# Patient Record
Sex: MALE | Race: WHITE | Employment: FULL TIME | URBAN - METROPOLITAN AREA
[De-identification: names, ages, dates, MRNs, and addresses within clinical notes are randomized per-mention and may not be internally consistent; named-entity substitution may affect disease eponyms.]

---

## 2019-08-11 ENCOUNTER — APPOINTMENT (OUTPATIENT)
Dept: GENERAL RADIOLOGY | Age: 54
End: 2019-08-11
Payer: COMMERCIAL

## 2019-08-11 ENCOUNTER — HOSPITAL ENCOUNTER (EMERGENCY)
Age: 54
Discharge: HOME | End: 2019-08-11
Attending: EMERGENCY MEDICINE
Payer: COMMERCIAL

## 2019-08-11 VITALS
DIASTOLIC BLOOD PRESSURE: 53 MMHG | TEMPERATURE: 98.1 F | WEIGHT: 294.19 LBS | HEART RATE: 56 BPM | SYSTOLIC BLOOD PRESSURE: 122 MMHG | BODY MASS INDEX: 46.08 KG/M2 | RESPIRATION RATE: 17 BRPM | OXYGEN SATURATION: 95 %

## 2019-08-11 DIAGNOSIS — R07.9 RIGHT-SIDED CHEST PAIN: Primary | ICD-10-CM

## 2019-08-11 DIAGNOSIS — R09.1 PLEURISY: ICD-10-CM

## 2019-08-11 LAB
A/G RATIO: 1.5 (ref 1.1–2.2)
ALBUMIN SERPL-MCNC: 4.2 G/DL (ref 3.4–5)
ALP BLD-CCNC: 117 U/L (ref 40–129)
ALT SERPL-CCNC: 22 U/L (ref 10–40)
ANION GAP SERPL CALCULATED.3IONS-SCNC: 11 MMOL/L (ref 3–16)
AST SERPL-CCNC: 19 U/L (ref 15–37)
BASOPHILS ABSOLUTE: 0.1 K/UL (ref 0–0.2)
BASOPHILS RELATIVE PERCENT: 0.7 %
BILIRUB SERPL-MCNC: 0.3 MG/DL (ref 0–1)
BUN BLDV-MCNC: 21 MG/DL (ref 7–20)
CALCIUM SERPL-MCNC: 8.9 MG/DL (ref 8.3–10.6)
CHLORIDE BLD-SCNC: 107 MMOL/L (ref 99–110)
CO2: 22 MMOL/L (ref 21–32)
CREAT SERPL-MCNC: 0.8 MG/DL (ref 0.9–1.3)
D DIMER: <200 NG/ML DDU (ref 0–229)
EOSINOPHILS ABSOLUTE: 0.2 K/UL (ref 0–0.6)
EOSINOPHILS RELATIVE PERCENT: 2.6 %
GFR AFRICAN AMERICAN: >60
GFR NON-AFRICAN AMERICAN: >60
GLOBULIN: 2.8 G/DL
GLUCOSE BLD-MCNC: 97 MG/DL (ref 70–99)
HCT VFR BLD CALC: 45.7 % (ref 40.5–52.5)
HEMOGLOBIN: 15.2 G/DL (ref 13.5–17.5)
LYMPHOCYTES ABSOLUTE: 2.1 K/UL (ref 1–5.1)
LYMPHOCYTES RELATIVE PERCENT: 24.1 %
MCH RBC QN AUTO: 29.1 PG (ref 26–34)
MCHC RBC AUTO-ENTMCNC: 33.4 G/DL (ref 31–36)
MCV RBC AUTO: 87.2 FL (ref 80–100)
MONOCYTES ABSOLUTE: 0.6 K/UL (ref 0–1.3)
MONOCYTES RELATIVE PERCENT: 6.6 %
NEUTROPHILS ABSOLUTE: 5.7 K/UL (ref 1.7–7.7)
NEUTROPHILS RELATIVE PERCENT: 66 %
PDW BLD-RTO: 13.8 % (ref 12.4–15.4)
PLATELET # BLD: 221 K/UL (ref 135–450)
PMV BLD AUTO: 8.1 FL (ref 5–10.5)
POTASSIUM REFLEX MAGNESIUM: 4.1 MMOL/L (ref 3.5–5.1)
RBC # BLD: 5.23 M/UL (ref 4.2–5.9)
SODIUM BLD-SCNC: 140 MMOL/L (ref 136–145)
TOTAL PROTEIN: 7 G/DL (ref 6.4–8.2)
TROPONIN: <0.01 NG/ML
WBC # BLD: 8.7 K/UL (ref 4–11)

## 2019-08-11 PROCEDURE — 99285 EMERGENCY DEPT VISIT HI MDM: CPT

## 2019-08-11 PROCEDURE — 80053 COMPREHEN METABOLIC PANEL: CPT

## 2019-08-11 PROCEDURE — 84484 ASSAY OF TROPONIN QUANT: CPT

## 2019-08-11 PROCEDURE — 85379 FIBRIN DEGRADATION QUANT: CPT

## 2019-08-11 PROCEDURE — 36415 COLL VENOUS BLD VENIPUNCTURE: CPT

## 2019-08-11 PROCEDURE — 93005 ELECTROCARDIOGRAM TRACING: CPT | Performed by: EMERGENCY MEDICINE

## 2019-08-11 PROCEDURE — 85025 COMPLETE CBC W/AUTO DIFF WBC: CPT

## 2019-08-11 PROCEDURE — 71046 X-RAY EXAM CHEST 2 VIEWS: CPT

## 2019-08-11 RX ORDER — IBUPROFEN 200 MG
200 TABLET ORAL EVERY 6 HOURS PRN
COMMUNITY

## 2019-08-11 RX ORDER — CHOLECALCIFEROL (VITAMIN D3) 1250 MCG
1 CAPSULE ORAL WEEKLY
COMMUNITY

## 2019-08-11 RX ORDER — GLUCOSAMINE SULFATE 500 MG
CAPSULE ORAL
COMMUNITY

## 2019-08-11 ASSESSMENT — ENCOUNTER SYMPTOMS
NAUSEA: 0
BACK PAIN: 0
DIARRHEA: 0
CONSTIPATION: 0
COUGH: 0
SHORTNESS OF BREATH: 0
ABDOMINAL PAIN: 0
VOMITING: 0

## 2019-08-11 NOTE — ED PROVIDER NOTES
This patient was seen by the Mid-Level Provider. I have seen and examined the patient, agree with the workup, evaluation, management and diagnosis. Care plan has been discussed. My assessment reveals a 49-year-old male who presents with some right-sided pleuritic chest pain. Patient presents with some shortness of breath and some right-sided pleuritic chest pain is had for the last several weeks. He denies substernal chest pain. He denies any cardiac history. The patient's work-up was unremarkable and normal including a chest x-ray and a d-dimer and a troponin. The patient is low risk based on his heart score and will be discharged with prompt follow-up with his primary care physician and instructed to return if worse. Exam: Well-nourished male in no acute distress. His chest was clear to auscultation bilaterally. Heart was regular rate rhythm with no murmurs rubs gallops. MDM: 49-year-old male who presents with pleuritic right-sided chest pain his scores low on her heart score. He will be discharged with prompt follow-up with his primary care and for a stress test.  He is to return if worse. EKG: Sinus rhythm at a rate of 68 beats a minute with no acute ST elevations or depressions or pathologic Q waves.     Results for orders placed or performed during the hospital encounter of 08/11/19   EKG 12 Lead   Result Value Ref Range    Ventricular Rate 68 BPM    Atrial Rate 68 BPM    P-R Interval 138 ms    QRS Duration 98 ms    Q-T Interval 372 ms    QTc Calculation (Bazett) 395 ms    P Axis -3 degrees    R Axis -10 degrees    T Axis 12 degrees    Diagnosis Normal sinus rhythmNormal ECG      Xr Chest Standard (2 Vw)    Result Date: 8/11/2019  EXAMINATION: TWO XRAY VIEWS OF THE CHEST 8/11/2019 4:18 pm COMPARISON: May 20, 2013 HISTORY: ORDERING SYSTEM PROVIDED HISTORY: chest pain TECHNOLOGIST PROVIDED HISTORY: Reason for exam:->chest pain Reason for Exam: CHEST PRESSURE FOR THE LAST WEEK WITH INCREASED SHORTNESS OF BREATH. NON SMOKER. HISTORY OF A ABDOMINAL HERNIA. Acuity: Acute Type of Exam: Initial FINDINGS: Lungs are clear. Cardiomegaly. No pulmonary edema. Moderate thoracic spine degenerative changes. No acute findings.          Leocadia Bosworth, MD  08/11/19 2746

## 2019-08-11 NOTE — ED PROVIDER NOTES
[address] [facility]  eMERGENCY dEPARTMENT eNCOUnter        Pt Name: Phil Lundy  MRN: [id number]  [place] 1965  Date of evaluation: 8/11/2019  Provider: FRANCISCO Jenkins  PCP: Mikayla Le  ED Attending: Edilberto Carrizales MD    CHIEF COMPLAINT       Chief Complaint   Patient presents with    Chest Pain     pt reports shortness of breath x2 weeks, upper right sided chest pain when he takes a deep breath. progressingly worsening. denies cardiac history. HISTORY OF PRESENT ILLNESS   (Location/Symptom, Timing/Onset, Context/Setting, Quality, Duration, Modifying Factors, Severity)  Note limiting factors. Phil Lundy is a 52 y.o. male who presents to the emergency department with complaints of right sided pleuritic chest pain and pain with movement of the right upper extremity going on for the past 2 weeks. Patient states that this morning when he got up and got out of bed he moved his right arm and had significant sharp pain in the right side chest.  Denies any radiation of this pain. Denies any focal numbness tingling or weakness. Denies any left-sided neck pain or chest pain. Denies any pressure-like sensation, diaphoresis or significant shortness of breath. No fevers, chills or hemoptysis. Denies any recent travel recent surgeries or prolonged immobilization. Denies any unilateral leg pain, swelling or rash. Denies any history of smoking. No history of hypertension, diabetes, hyperlipidemia and family history is pretty insignificant for cardiac disease. No history of CAD for himself. Nursing Notes were all reviewed and agreed with or any disagreements were addressed  in the HPI. REVIEW OF SYSTEMS    (2-9 systems for level 4, 10 or more for level 5)     Review of Systems   Constitutional: Negative for chills, fatigue and fever. Respiratory: Negative for cough and shortness of breath. Cardiovascular: Positive for chest pain.  Negative for palpitations and leg swelling. Gastrointestinal: Negative for abdominal pain, constipation, diarrhea, nausea and vomiting. Musculoskeletal: Negative for back pain and neck pain. Neurological: Negative for weakness, light-headedness and numbness. All other systems reviewed and are negative. Positives and pertinent negatives as per HPI. All other systems were reviewed and are negative. PHYSICAL EXAM    (up to 7 for level 4, 8 or more for level 5)     ED Triage Vitals [08/11/19 1556]   BP Temp Temp src Pulse Resp SpO2 Height Weight   (!) 163/88 98.1 °F (36.7 °C) -- 67 16 96 % -- 294 lb 3 oz (133.4 kg)       Physical Exam   Constitutional: He is oriented to person, place, and time. He appears well-developed and well-nourished. He is active and cooperative. Non-toxic appearance. HENT:   Head: Normocephalic. Right Ear: External ear normal.   Left Ear: External ear normal.   Nose: Nose normal.   Eyes: Conjunctivae are normal. Right eye exhibits no discharge. Left eye exhibits no discharge. Neck: Normal range of motion. Neck supple. Cardiovascular: Normal rate, regular rhythm and normal heart sounds. Exam reveals no gallop and no friction rub. No murmur heard. Pulses:       Radial pulses are 2+ on the right side, and 2+ on the left side. Pulmonary/Chest: Effort normal and breath sounds normal. No stridor. No respiratory distress. He has no wheezes. He has no rales. He exhibits no tenderness. Abdominal: Soft. Bowel sounds are normal. He exhibits no distension and no mass. There is no tenderness. There is no guarding. Musculoskeletal: Normal range of motion. Neurological: He is alert and oriented to person, place, and time. Skin: Skin is warm and dry. He is not diaphoretic. No pallor. Psychiatric: He has a normal mood and affect. His behavior is normal.   Nursing note and vitals reviewed.       PAST MEDICAL HISTORY     Past Medical History:   Diagnosis Date    Abdominal hernia SURGICAL HISTORY     History reviewed. No pertinent surgical history. CURRENT MEDICATIONS       Discharge Medication List as of 8/11/2019  6:02 PM      CONTINUE these medications which have NOT CHANGED    Details   Glucosamine 500 MG CAPS Take by mouthHistorical Med      ibuprofen (ADVIL;MOTRIN) 200 MG tablet Take 200 mg by mouth every 6 hours as needed for PainHistorical Med      Cholecalciferol (VITAMIN D3) 47573 units CAPS Take 1 capsule by mouth once a weekHistorical Med      nabumetone (RELAFEN) 750 MG tablet Take 750 mg by mouth 2 times daily. predniSONE (DELTASONE) 10 MG tablet Take 2 tablets daily x 3 days and then 1 tablet daily X 3 days. Start Monday, Disp-10 tablet, R-0             ALLERGIES     Penicillins    FAMILY HISTORY     History reviewed. No pertinent family history.      SOCIAL HISTORY       Social History     Socioeconomic History    Marital status:      Spouse name: None    Number of children: None    Years of education: None    Highest education level: None   Occupational History    None   Social Needs    Financial resource strain: None    Food insecurity:     Worry: None     Inability: None    Transportation needs:     Medical: None     Non-medical: None   Tobacco Use    Smoking status: Never Smoker    Smokeless tobacco: Never Used   Substance and Sexual Activity    Alcohol use: Yes     Comment: occ    Drug use: No    Sexual activity: None   Lifestyle    Physical activity:     Days per week: None     Minutes per session: None    Stress: None   Relationships    Social connections:     Talks on phone: None     Gets together: None     Attends Nondenominational service: None     Active member of club or organization: None     Attends meetings of clubs or organizations: None     Relationship status: None    Intimate partner violence:     Fear of current or ex partner: None     Emotionally abused: None     Physically abused: None     Forced sexual activity: None   Other Topics Concern    None   Social History Narrative    None       SCREENINGS           DIAGNOSTIC RESULTS   LABS:    Labs Reviewed   COMPREHENSIVE METABOLIC PANEL W/ REFLEX TO MG FOR LOW K - Abnormal; Notable for the following components:       Result Value    BUN 21 (*)     CREATININE 0.8 (*)     All other components within normal limits    Narrative:     downtime  Performed at:  [facility] [address],  [place], [address]   Phone [phone]   CBC WITH AUTO DIFFERENTIAL    Narrative:     downtime  Performed at:  [facility] [address],  [place], [address]   Phone [phone]   TROPONIN    Narrative:     downtime  Performed at:  [facility] [address] [facility],  [place], [address]   Phone [phone]   D-DIMER, QUANTITATIVE    Narrative:     downtime  Performed at:  [facility] [address],  [place], [address]   Phone [phone]       All other labs were within normal range or not returned as of this dictation. EKG: All EKG's areinterpreted by the Emergency Department Physician who either signs or Co-signs this chart in the absence of a cardiologist.    RADIOLOGY:   Non-plain film images such as CT, Ultrasound and MRI are read by the radiologist. August Rhys radiographicimages are visualized and preliminarily interpreted by the  ED Provider with the below findings:    Interpretation per the Radiologist below, if available at the time of this note:    XR CHEST STANDARD (2 VW)   Final Result   No acute findings. Xr Chest Standard (2 Vw)    Result Date: 8/11/2019  EXAMINATION: TWO XRAY VIEWS OF THE CHEST 8/11/2019 4:18 pm COMPARISON: May 20, 2013 HISTORY: ORDERING SYSTEM PROVIDED HISTORY: chest pain TECHNOLOGIST PROVIDED HISTORY: Reason for exam:->chest pain Reason for Exam: CHEST PRESSURE FOR THE LAST WEEK WITH INCREASED SHORTNESS OF BREATH. NON SMOKER. HISTORY OF A ABDOMINAL HERNIA. Acuity: Acute Type of Exam: Initial FINDINGS: Lungs are clear. Cardiomegaly. No pulmonary edema. Moderate thoracic spine degenerative changes. No acute findings. PROCEDURES   Unless otherwisenoted below, none     Procedures    CRITICAL CARE TIME   N/A    CONSULTS:  None    EMERGENCY DEPARTMENT COURSE andDIFFERENTIAL DIAGNOSIS/MDM:   Vitals:    Vitals:    08/11/19 1700 08/11/19 1715 08/11/19 1730 08/11/19 1745   BP: (!) 146/67 (!) 138/49 119/63 (!) 122/53   Pulse: 63 62 60 56   Resp: 15 21 19 17   Temp:       SpO2:    95%   Weight:           Patient wasgiven the following medications:  Medications - No data to display  Setswana Headings is a 52 y.o. male who presents to the emergency department with complaints of right sided pleuritic chest pain and pain with movement of the right upper extremity going on for the past 2 weeks. Patient states that this morning when he got up and got out of bed he moved his right arm and had significant sharp pain in the right side chest.  Denies any radiation of this pain. Denies any focal numbness tingling or weakness. Denies any left-sided neck pain or chest pain. Denies any pressure-like sensation, diaphoresis or significant shortness of breath. No fevers, chills or hemoptysis. Denies any recent travel recent surgeries or prolonged immobilization. Denies any unilateral leg pain, swelling or rash. Denies any history of smoking. No history of hypertension, diabetes, hyperlipidemia and family history is pretty insignificant for cardiac disease. No history of CAD for himself. We are on current lab downtime, all laboratory evaluation is done on paper charts. Patient has a negative troponin, 0.01. BUN of 21, creatinine of 0.7. Calcium of 8.9. LFTs are normal range. Potassium normal at 4.1. Normal white count 8.7. He will been stable at 15.2. D-dimer is less than 200. Normal troponin.  Chest x-ray shows no evidence of acute Sherry Valladares

## 2019-08-12 LAB
EKG ATRIAL RATE: 68 BPM
EKG DIAGNOSIS: NORMAL
EKG P AXIS: -3 DEGREES
EKG P-R INTERVAL: 138 MS
EKG Q-T INTERVAL: 372 MS
EKG QRS DURATION: 98 MS
EKG QTC CALCULATION (BAZETT): 395 MS
EKG R AXIS: -10 DEGREES
EKG T AXIS: 12 DEGREES
EKG VENTRICULAR RATE: 68 BPM

## 2019-08-12 PROCEDURE — 93010 ELECTROCARDIOGRAM REPORT: CPT | Performed by: INTERNAL MEDICINE
